# Patient Record
Sex: FEMALE | Employment: FULL TIME | URBAN - METROPOLITAN AREA
[De-identification: names, ages, dates, MRNs, and addresses within clinical notes are randomized per-mention and may not be internally consistent; named-entity substitution may affect disease eponyms.]

---

## 2017-11-20 ENCOUNTER — VIRTUAL VISIT (OUTPATIENT)
Dept: FAMILY MEDICINE | Facility: OTHER | Age: 32
End: 2017-11-20

## 2019-09-19 ENCOUNTER — THERAPY VISIT (OUTPATIENT)
Dept: PHYSICAL THERAPY | Facility: CLINIC | Age: 34
End: 2019-09-19
Payer: COMMERCIAL

## 2019-09-19 DIAGNOSIS — M53.3 SACROILIAC PAIN: ICD-10-CM

## 2019-09-19 PROCEDURE — 97161 PT EVAL LOW COMPLEX 20 MIN: CPT | Mod: GP | Performed by: PHYSICAL THERAPIST

## 2019-09-19 PROCEDURE — 97140 MANUAL THERAPY 1/> REGIONS: CPT | Mod: GP | Performed by: PHYSICAL THERAPIST

## 2019-09-19 PROCEDURE — 97110 THERAPEUTIC EXERCISES: CPT | Mod: GP | Performed by: PHYSICAL THERAPIST

## 2019-09-19 NOTE — LETTER
[facility] PHYSICAL THERAPY  [address] [facility] [facility] [address]  [phone]    2019    Re: Brian Hensley   :   1985  MRN:  [id number]   REFERRING PHYSICIAN:   Carey Prieto    [facility] PHYSICAL THERAPY  Date of Initial Evaluation:  2019  Visits:  Rxs Used: 1  Reason for Referral:  Sacroiliac pain    EVALUATION SUMMARY    [facility] Initial Evaluation  Subjective:  The history is provided by the patient. No  was used.   Patient reported as 7/10 (4/10 at it's best) on pain scale. General health as reported by patient is excellent. Pertinent medical history includes:  None.    Surgeries include:  Other. Other surgery history details: 2 C-sections.  Current medications:  None.   Primary job tasks include:  Lifting/carrying, prolonged sitting, prolonged standing and repetitive tasks.  Pain is described as sharp (catch, achy/sore) and is constant. Pain is the same all the time. Since onset symptoms are unchanged.      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    Barriers include:  None as reported by patient.  Red flags:  None as reported by patient.  Type of problem:  Pelvic dysfunction  Condition occurred with:  During pregnancy. This is a new condition   Problem details: Pt reports she's having SI pain related to her pregnancy. Notes her entire pelvis is bothering her through B sacrum and pubic bone. 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 Doesn't feel like one side is stuck, everything just hurts. Onset about 1.5 months ago. Pt is currently 28 weeks pregnant with her 3rd child. Other kids are 4 and 2 and both were born . Had similar pain with previous pregnancies which mostly resolved after delivery but it was manageable. Pain this time started earlier than before. .   Patient reports pain:  SI joint left, SI joint right and  pubic (Denies vaginal or rectal pain).   Exacerbated by: up and down off the floor. Just being on her feet, walking, in and out of her car, movement in general, laying down. and relieved by rest (stability exercises).    Objective:  System  Lumbar/SI Evaluation  ROM:    AROM Lumbar:   Flexion:          To floor - pain  Ext:                    WNL - pain   Side Bend:        Left:  To knees - pain    Right:  To knees - pain  Rotation:           Left:     Right:   Re: Brian Hensley   :   1985    Side Glide:        Left:     Right:     Lumbar Palpation:    Tenderness present at Left:    Piriformis (Very tight and tender in piriformis); PSIS and Gluteus Medius  Tenderness not present at Left:    Quadratus Lumborum; Erector Spinae; Iliac Crest or Vertebral  Tenderness present at Right: Piriformis and PSIS  Tenderness not present at Right:  Quadratus Lumborum; Erector Spinae; Iliac Crest; Gluteus Medius or Vertebral    Lumbar Provocation:    Left negative with:  PROM hip  Right negative with:  PROM hip    SI joint/Sacrum:    Normal ilial/ischial alignment. Did have some sacral torsion with L posterior sacral border and posterior PSIS. Tenderness noted over pubic bone. Normal alignment.    Assessment/Plan:    Patient is a 34 year old female with sacral and pelvic complaints.    Patient has the following significant findings with corresponding treatment plan.                Diagnosis 1:  B SI and pubic pain  Pain -  manual therapy, self management, education and home program  Decreased ROM/flexibility - manual therapy, therapeutic exercise, therapeutic activity and home program  Decreased strength - therapeutic exercise, therapeutic activities and home program  Impaired muscle performance - neuro re-education and home program  Decreased function - therapeutic activities and home program  Impaired posture - neuro re-education, therapeutic activities and home program  Instability -  Therapeutic Activity  Therapeutic  Exercise  Neuromuscular Re-education  home program    Therapy Evaluation Codes:   1) History comprised of:   Personal factors that impact the plan of care:      None.    Comorbidity factors that impact the plan of care are:      None.     Medications impacting care: None.  2) Examination of Body Systems comprised of:   Body structures and functions that impact the plan of care:      Pelvis and Sacral illiac joint.   Activity limitations that impact the plan of care are:      Lifting, Sitting, Stairs, Standing and Walking.  3) Clinical presentation characteristics are:   Stable/Uncomplicated.  4) Decision-Making    Re: Brian Hensley   :   1985      Low complexity using standardized patient assessment instrument and/or measureable assessment of functional outcome.  Cumulative Therapy Evaluation is: Low complexity.    Previous and current functional limitations:  (See Goal Flow Sheet for this information)    Short term and Long term goals: (See Goal Flow Sheet for this information)     Communication ability:  Patient appears to be able to clearly communicate and understand verbal and written communication and follow directions correctly.  Treatment Explanation - The following has been discussed with the patient:   RX ordered/plan of care  Anticipated outcomes  Possible risks and side effects  This patient would benefit from PT intervention to resume normal activities.   Rehab potential is good.    Frequency:  1 X week, once daily  Duration:  for 6-8 weeks  Discharge Plan:  Achieve all LTG.  Independent in home treatment program.  Reach maximal therapeutic benefit.    Thank you for your referral.    INQUIRIES  Therapist: ARTHUR Gregg [facility] PHYSICAL THERAPY  [address] [address]  Phone: [phone]  Fax: [phone]

## 2019-09-19 NOTE — PROGRESS NOTES
[facility] Initial Evaluation  Subjective:  The history is provided by the patient. No  was used.     and reported as 7/10 (4/10 at it's best) on pain scale. General health as reported by patient is excellent. Pertinent medical history includes:  None.    Surgeries include:  Other. Other surgery history details: 2 C-sections.  Current medications:  None.   Primary job tasks include:  Lifting/carrying, prolonged sitting, prolonged standing and repetitive tasks.  Pain is described as sharp (catch, achy/sore) and is constant. Pain is the same all the time. Since onset symptoms are unchanged.      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    Barriers include:  None as reported by patient.  Red flags:  None as reported by patient.  Type of problem:  Pelvic dysfunction   Condition occurred with:  During pregnancy. This is a new condition   Problem details: Pt reports she's having SI pain related to her pregnancy. Notes her entire pelvis is bothering her through B sacrum and pubic bone. 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 Doesn't feel like one side is stuck, everything just hurts. Onset about 1.5 months ago. Pt is currently 28 weeks pregnant with her 3rd child. Other kids are 4 and 2 and both were born . Had similar pain with previous pregnancies which mostly resolved after delivery but it was manageable. Pain this time started earlier than before. .   Patient reports pain:  SI joint left, SI joint right and pubic (Denies vaginal or rectal pain).   Exacerbated by: up and down off the floor. Just being on her feet, walking, in and out of her car, movement in general, laying down. and relieved by rest (stability exercises).                      Objective:  System         Lumbar/SI Evaluation  ROM:    AROM Lumbar:   Flexion:          To floor - pain  Ext:                    WNL -  pain   Side Bend:        Left:  To knees - pain    Right:  To knees - pain  Rotation:           Left:     Right:   Side Glide:        Left:     Right:                     Lumbar Palpation:    Tenderness present at Left:    Piriformis (Very tight and tender in piriformis); PSIS and Gluteus Medius  Tenderness not present at Left:    Quadratus Lumborum; Erector Spinae; Iliac Crest or Vertebral  Tenderness present at Right: Piriformis and PSIS  Tenderness not present at Right:  Quadratus Lumborum; Erector Spinae; Iliac Crest; Gluteus Medius or Vertebral    Lumbar Provocation:      Left negative with:  PROM hip    Right negative with:  PROM hip    SI joint/Sacrum:    Normal ilial/ischial alignment. Did have some sacral torsion with L posterior sacral border and posterior PSIS. Tenderness noted over pubic bone. Normal alignment.                                                       General     ROS    Assessment/Plan:    Patient is a 34 year old female with sacral and pelvic complaints.    Patient has the following significant findings with corresponding treatment plan.                Diagnosis 1:  B SI and pubic pain  Pain -  manual therapy, self management, education and home program  Decreased ROM/flexibility - manual therapy, therapeutic exercise, therapeutic activity and home program  Decreased strength - therapeutic exercise, therapeutic activities and home program  Impaired muscle performance - neuro re-education and home program  Decreased function - therapeutic activities and home program  Impaired posture - neuro re-education, therapeutic activities and home program  Instability -  Therapeutic Activity  Therapeutic Exercise  Neuromuscular Re-education  home program    Therapy Evaluation Codes:   1) History comprised of:   Personal factors that impact the plan of care:      None.    Comorbidity factors that impact the plan of care are:      None.     Medications impacting care: None.  2) Examination of Body  Systems comprised of:   Body structures and functions that impact the plan of care:      Pelvis and Sacral illiac joint.   Activity limitations that impact the plan of care are:      Lifting, Sitting, Stairs, Standing and Walking.  3) Clinical presentation characteristics are:   Stable/Uncomplicated.  4) Decision-Making    Low complexity using standardized patient assessment instrument and/or measureable assessment of functional outcome.  Cumulative Therapy Evaluation is: Low complexity.    Previous and current functional limitations:  (See Goal Flow Sheet for this information)    Short term and Long term goals: (See Goal Flow Sheet for this information)     Communication ability:  Patient appears to be able to clearly communicate and understand verbal and written communication and follow directions correctly.  Treatment Explanation - The following has been discussed with the patient:   RX ordered/plan of care  Anticipated outcomes  Possible risks and side effects  This patient would benefit from PT intervention to resume normal activities.   Rehab potential is good.    Frequency:  1 X week, once daily  Duration:  for 6-8 weeks  Discharge Plan:  Achieve all LTG.  Independent in home treatment program.  Reach maximal therapeutic benefit.    Please refer to the daily flowsheet for treatment today, total treatment time and time spent performing 1:1 timed codes.

## 2019-09-30 ENCOUNTER — THERAPY VISIT (OUTPATIENT)
Dept: PHYSICAL THERAPY | Facility: CLINIC | Age: 34
End: 2019-09-30
Payer: COMMERCIAL

## 2019-09-30 DIAGNOSIS — M53.3 SACROILIAC PAIN: ICD-10-CM

## 2019-09-30 PROCEDURE — 97140 MANUAL THERAPY 1/> REGIONS: CPT | Mod: GP | Performed by: PHYSICAL THERAPIST

## 2019-09-30 PROCEDURE — 97112 NEUROMUSCULAR REEDUCATION: CPT | Mod: GP | Performed by: PHYSICAL THERAPIST

## 2019-09-30 PROCEDURE — 97110 THERAPEUTIC EXERCISES: CPT | Mod: GP | Performed by: PHYSICAL THERAPIST

## 2019-10-09 ENCOUNTER — THERAPY VISIT (OUTPATIENT)
Dept: PHYSICAL THERAPY | Facility: CLINIC | Age: 34
End: 2019-10-09
Payer: COMMERCIAL

## 2019-10-09 DIAGNOSIS — M53.3 SACROILIAC PAIN: ICD-10-CM

## 2019-10-09 PROCEDURE — 97110 THERAPEUTIC EXERCISES: CPT | Mod: GP | Performed by: PHYSICAL THERAPIST

## 2019-10-09 PROCEDURE — 97112 NEUROMUSCULAR REEDUCATION: CPT | Mod: GP | Performed by: PHYSICAL THERAPIST

## 2019-10-09 PROCEDURE — 97140 MANUAL THERAPY 1/> REGIONS: CPT | Mod: GP | Performed by: PHYSICAL THERAPIST

## 2019-10-14 ENCOUNTER — THERAPY VISIT (OUTPATIENT)
Dept: PHYSICAL THERAPY | Facility: CLINIC | Age: 34
End: 2019-10-14
Payer: COMMERCIAL

## 2019-10-14 DIAGNOSIS — M53.3 SACROILIAC PAIN: ICD-10-CM

## 2019-10-14 PROCEDURE — 97140 MANUAL THERAPY 1/> REGIONS: CPT | Mod: GP | Performed by: PHYSICAL THERAPIST

## 2019-10-14 PROCEDURE — 97112 NEUROMUSCULAR REEDUCATION: CPT | Mod: GP | Performed by: PHYSICAL THERAPIST

## 2019-10-14 PROCEDURE — 97110 THERAPEUTIC EXERCISES: CPT | Mod: GP | Performed by: PHYSICAL THERAPIST

## 2019-10-28 ENCOUNTER — THERAPY VISIT (OUTPATIENT)
Dept: PHYSICAL THERAPY | Facility: CLINIC | Age: 34
End: 2019-10-28
Payer: COMMERCIAL

## 2019-10-28 DIAGNOSIS — M53.3 SACROILIAC PAIN: ICD-10-CM

## 2019-10-28 PROCEDURE — 97112 NEUROMUSCULAR REEDUCATION: CPT | Mod: GP | Performed by: PHYSICAL THERAPIST

## 2019-10-28 PROCEDURE — 97140 MANUAL THERAPY 1/> REGIONS: CPT | Mod: GP | Performed by: PHYSICAL THERAPIST

## 2019-10-28 PROCEDURE — 97110 THERAPEUTIC EXERCISES: CPT | Mod: GP | Performed by: PHYSICAL THERAPIST

## 2019-11-11 ENCOUNTER — THERAPY VISIT (OUTPATIENT)
Dept: PHYSICAL THERAPY | Facility: CLINIC | Age: 34
End: 2019-11-11
Payer: COMMERCIAL

## 2019-11-11 DIAGNOSIS — M53.3 SACROILIAC PAIN: ICD-10-CM

## 2019-11-11 PROCEDURE — 97140 MANUAL THERAPY 1/> REGIONS: CPT | Mod: GP | Performed by: PHYSICAL THERAPIST

## 2019-11-11 PROCEDURE — 97110 THERAPEUTIC EXERCISES: CPT | Mod: GP | Performed by: PHYSICAL THERAPIST

## 2019-11-11 PROCEDURE — 97112 NEUROMUSCULAR REEDUCATION: CPT | Mod: GP | Performed by: PHYSICAL THERAPIST

## 2019-12-24 ENCOUNTER — VIRTUAL VISIT (OUTPATIENT)
Dept: FAMILY MEDICINE | Facility: OTHER | Age: 34
End: 2019-12-24

## 2019-12-24 NOTE — PROGRESS NOTES
"Date: 2019 08:48:23  Clinician: Elsa Parada  Clinician NPI: [id number]  Patient: Brian Hensley  Patient : 1985  Patient Address: [address] [place], [place] [address]  [place] [address]  Patient Phone: [phone]  Visit Protocol: Eye conditions  Patient Summary:  Brian is a 34 year old (: 1985 ) female who initiated a Visit for conjunctivitis.  When asked the question \"Please sign me up to receive news, health information and promotions. \", Brian responded \"No\".    Images of her eye condition were uploaded.   Her symptoms started 1-2 days ago and affect the left eye. The symptoms consist of eye redness, itchy eye(s), eye pain, and drainage coming from the eye(s). Additionally, her vision has become more blurry since her symptoms started, but it's possible the blurry vision is caused by the eyelid swelling and/or drainage coming from the eye(s).   Symptom details     Drainage: The color of the drainage coming out of her eye(s) is yellow. The drainage is thick and causes her eyelids to be stuck shut in the morning.    Itchiness: Brian does not have seasonal allergies or hay fever.    Eye pain: She is experiencing moderate eye pain (4-6 on a 10 point pain scale). She has not taken over-the-counter medication for the pain.     Denied symptoms include bumps on the eyelid, eyelid swelling, and light sensitivity. Brian does not have subconjunctival hemorrhage. She does not feel feverish.   Precipitating events   Brian has not had a recent diagnosis of conjunctivitis. She also has not had a recent eye surgery, foreign body in the eye(s), eye injury, and cold or ear infection. It is not known if Brian has recently been exposed to someone with a red eye or an eye infection. She does not wear contact lenses.   Pertinent medical history  Brian has not ever been diagnosed with glaucoma.   Brian is not taking medication to treat her current symptoms.   Brian does not require proof of evaluation of her eye " condition before returning to school, work, or .   Brian does not smoke or use smokeless tobacco.   She denies pregnancy and is breastfeeding. Her last period was over a month ago.     MEDICATIONS: No current medications, ALLERGIES: NKDA  Clinician Response:  Dear Brian,  Based on the information provided, you most likely have bacterial conjunctivitis, more commonly called pink eye.  Medication information  I am prescribing:  Polymyxin B sulf-trimethoprim (Polytrim) 10,000 unit- 1 mg/mL ophthalmic (eye) drops. Apply 1 drop into the affected eye(s) every 3 hours, up to 6 times a day for 7 days. There are no refills with this prescription.  The medication I prescribed is an antibiotic medication. Infections can be caused by either bacteria or a virus, and often have similar symptoms, so it is possible that this is a viral infection. Antibiotics are only effective against bacterial infections, so when it is caused by a virus, the medication will not help symptoms improve or make it less contagious.  Self care  To reduce the spread of the eye infection, you should not use eye makeup until the infection has fully resolved, and be sure to wash your hands at least once per hour and avoid touching the eyes as much as possible.  The following will reduce the risk for future eye infections:     Frequent handwashing    Replace towels and washcloths daily    Do not share towels and washcloths with others    Replace eye makeup used while eyes were infected    Do not use anyone else's eye makeup     Steps you can take to be as comfortable as possible:     Avoid rubbing your eyes    Apply a cool compress to the eye(s)    Take regular breaks and remember to blink regularly when reading or using a computer for long periods of time    Wear sunglasses when outside    Wear eye protection when swimming or working with chemicals    Use good lighting     When to seek care  Please make an appointment to be seen in a clinic or  urgent care if any of the following occurs:     You develop new symptoms or your symptoms becomes worse.    Your symptoms do not improve within 2 days of starting treatment.      Diagnosis: Bacterial conjunctivitis  Diagnosis ICD: H10.9  Prescription: polymyxin B sulf-trimethoprim (Polytrim) 10,000 unit- 1 mg/mL ophthalmic (eye) drops 1 10 ml dropper bottle, 7 days supply. Apply 1 drop into the affected eye(s) every 3 hours up to 6 times a day for 7 days. Refills: 0, Refill as needed: no, Allow substitutions: yes  Pharmacy: [facility] [place] - [phone] - [address] [facility] [place] [address]

## 2020-02-26 PROBLEM — M53.3 SACROILIAC PAIN: Status: RESOLVED | Noted: 2019-09-19 | Resolved: 2020-02-26

## 2020-02-26 NOTE — PROGRESS NOTES
Discharge Note    Progress reporting period is from initial evaluation date (please see noted date below) to Nov 11, 2019.  Linked Episodes   Type: Episode: Status: Noted: Resolved: Last update: Updated by:   PHYSICAL THERAPY Pelvic pain 9/19/19 Active 9/19/2019 11/11/2019  8:38 AM Erin Brasher, PT      Comments:       Brian failed to follow up and current status is unknown.  Please see information below for last relevant information on current status.  Patient seen for 6 visits.    SUBJECTIVE  Subjective changes noted by patient:  States it has been more difficult to work as she is squatting alot, getting in/out of the car. No longer lifting son into/out of carseat. Has cut back on cleaning at home. Being up on feet is painful.  States she has had some pubic pain, constant ache, increases with activitvy. B PSIS pain  .  Current pain level is 4/10.     Previous pain level was   .   Changes in function:  Yes (See Goal flowsheet attached for changes in current functional level)  Adverse reaction to treatment or activity: None    OBJECTIVE  Changes noted in objective findings: 35 weeks reviewed body mech with functional brace, pacing self and taking more rest breaks. Modified ex program per below. Tender to palpation L piriformis     ASSESSMENT/PLAN  Diagnosis: SI pain     STG/LTGs have been met or progress has been made towards goals:  Yes, please see goal flowsheet for most current information  Assessment of Progress: current status is unknown.    Last current status: (slow progress as pregnancy progresses)   Self Management Plans:  HEP  I have re-evaluated this patient and find that the nature, scope, duration and intensity of the therapy is appropriate for the medical condition of the patient.  Brian continues to require the following intervention to meet STG and LTG's:  HEP.    Recommendations:  Discharge with current home program.  Patient to follow up with MD as needed.    Please refer to the daily  flowsheet for treatment today, total treatment time and time spent performing 1:1 timed codes.

## 2023-02-03 ENCOUNTER — TRANSCRIBE ORDERS (OUTPATIENT)
Dept: OTHER | Age: 38
End: 2023-02-03

## 2023-02-03 DIAGNOSIS — S69.92XA INJURY OF LEFT THUMB: Primary | ICD-10-CM

## 2023-02-09 ENCOUNTER — THERAPY VISIT (OUTPATIENT)
Dept: OCCUPATIONAL THERAPY | Facility: CLINIC | Age: 38
End: 2023-02-09
Attending: PHYSICIAN ASSISTANT
Payer: COMMERCIAL

## 2023-02-09 DIAGNOSIS — M79.645 THUMB PAIN, LEFT: Primary | ICD-10-CM

## 2023-02-09 PROCEDURE — 97110 THERAPEUTIC EXERCISES: CPT | Mod: GO | Performed by: OCCUPATIONAL THERAPIST

## 2023-02-09 PROCEDURE — 97760 ORTHOTIC MGMT&TRAING 1ST ENC: CPT | Mod: GO | Performed by: OCCUPATIONAL THERAPIST

## 2023-02-09 PROCEDURE — 97165 OT EVAL LOW COMPLEX 30 MIN: CPT | Mod: GO | Performed by: OCCUPATIONAL THERAPIST

## 2023-02-09 NOTE — PROGRESS NOTES
Hand Therapy Initial Evaluation    Current Date:  2023    Subjective:  Brian Hensley is a 37 year old right hand dominant female.    Diagnosis:   Left thumb injury/UCL strain possible avulsion fracture  DOI:  2023  Therapy referral: 2023  Post:  5w 4d    Patient reports symptoms of pain, stiffness/loss of motion, weakness/loss of strength and edema of the left thumb which occurred due to mitten getting caught in manual ice auger. Since onset symptoms are gradually getting better.  Special tests:  x-ray and MRI.  Previous treatment: OTC splint some of time.  General health as reported by patient is excellent.  Pertinent medical history includes:None  Medical allergies:none.  Surgical history: other:  x 3.  Medication history: None.    Occupational Profile Information:  Current occupation is PT in schools  Currently working in normal job without restrictions  Job Tasks: Computer Work, Lifting, Carrying, Prolonged Standing, Pushing, Pulling  Prior functional level:  independent-shared household chores  Barriers include:none  Mobility: No difficulty  Transportation: drives  Leisure activities/hobbies: 3 children age 3, 5 and 7    Functional Outcome Measure:  Upper Extremity Functional Index  SCORE:   Column Totals: 65/80  (A lower score indicates greater disability.)    Pain Level (Scale 0-10)   2023   At Rest 0   With Use 3   Worst 5-6     Pain Description  Date 2023   Location Thumb MP and IP joints   Pain Quality Aching and Sharp   Frequency intermittent     Pain is worst  daytime   Exacerbated by  pinching, stress thumb   Relieved by rest and splint wear   Progression Gradually getting better     Edema  Mild at thumb MCP UCL    Sensation  WNL throughout all nerve distributions; per patient report    ROM  Pain Report:  - none +    mild    ++ moderate    +++ severe   Thumb 2023   AROM  (PROM) Right Left   MP -5/60 -5/40   IP /65 /45   RABD 50 40   PABD 45 42   Retropulsion 4.0  2.5   Kapandji Opposition Scale (0-10/10) 10/10 8/10     Special Tests   2/9/2023   Thumb MP UCL Stress + stable but painful   Thumb MP RCL Stress -     Strength   (Measured in pounds)  Pain Report: - none  + mild    ++ moderate    +++ severe    2/9/2023 2/9/2023   Trials Right Left   1  2  3 72  68  69 66-  68-  59+   Average 70 34     Lat Pinch 2/9/2023 2/9/2023   Trials Right Left   1  2  3 20  19  19 15+  17+  17+   Average 19 16     3 Pt Pinch 2/9/2023 2/9/2023   Trials Right Left   1  2  3 20  19  19 12++  13++  13++   Average 19 13     Assessment:  Patient presents with symptoms consistent with diagnosis of left thumb injury, with conservative intervention.     Patient's limitations or Problem List includes:  Pain, Decreased ROM/motion, Increased edema, Weakness, Decreased stability and Decreased pinch of the left thumb which interferes with the patient's ability to perform Self Care Tasks (dressing), Work Tasks, Recreational Activities and Household Chores as compared to previous level of function.    Rehab Potential:  Excellent - Return to full activity, no limitations    Patient will benefit from skilled Occupational Therapy to increase ROM, flexibility, overall strength, pinch strength and stability of thumb and decrease pain and edema to return to previous activity level and resume normal daily tasks and to reach their rehab potential.    Barriers to Learning:  No barrier    Communication Issues:  Patient appears to be able to clearly communicate and understand verbal and written communication and follow directions correctly.    Chart Review: Chart Review and Simple history review with patient    Identified Performance Deficits: dressing, home establishment and management, work and volunteer activities    Assessment of Occupational Performance:  5 or more Performance Deficits    Clinical Decision Making (Complexity): Low complexity    Treatment Explanation:  The following has been discussed with the  patient:  RX ordered/plan of care  Anticipated outcomes  Possible risks and side effects    Plan:  Frequency:  2 X a month, once daily  Duration:  for 2 months  Treatment Plan:    Modalities:    US and Paraffin   Therapeutic Exercise:    AROM, PROM, Tendon Gliding, Blocking, Isotonics, Isometrics and Stabilization  Orthotic Fabrication:    Static Hand based  Self Care:    Self Care Tasks and Diagnostic Education     Discharge Plan:  Achieve all LTG.  Independent in home treatment program.  Reach maximal therapeutic benefit.    Home Exercise Program:  Warmth for stiffness  Ice for pain after activity  Gentle AROM thumb E/F, opposition and circumduction   MP and IP blocking  Avoid stress to UCL of thumb  Wear custom HBTS orthosis as needed to protection thumb     Next Visit:  See in 2-4 weeks  Assess response to orthosis and HEP  Progress to thumb strengthening and stabilization as tolerated

## 2023-02-09 NOTE — LETTER
[facility]  [address] [address]  [address]  [place] [address]  [phone]    2023    Re: Brian Hensley   :   1985  MRN:  [id number]   REFERRING PHYSICIAN:   Heathre NICK [facility]    Date of Initial Evaluation:  2023  Visits:  Rxs Used: 1  Reason for Referral:  Thumb pain, left    EVALUATION SUMMARY    Hand Therapy Initial Evaluation    Current Date:  2023    Subjective:  Brian Hensley is a 37 year old right hand dominant female.  Diagnosis:   Left thumb injury/UCL strain possible avulsion fracture  DOI:  2023  Therapy referral: 2023  Post:  5w 4d  Patient reports symptoms of pain, stiffness/loss of motion, weakness/loss of strength and edema of the left thumb which occurred due to mitten getting caught in manual ice auger. Since onset symptoms are gradually getting better.  Special tests:  x-ray and MRI.  Previous treatment: OTC splint some of time.  General health as reported by patient is excellent.  Pertinent medical history includes:None  Medical allergies:none.  Surgical history: other:  x 3.  Medication history: None.    Occupational Profile Information:  Current occupation is PT in schools  Currently working in normal job without restrictions  Job Tasks: Computer Work, Lifting, Carrying, Prolonged Standing, Pushing, Pulling  Prior functional level:  independent-shared household chores  Barriers include:none  Mobility: No difficulty  Transportation: drives  Leisure activities/hobbies: 3 children age 3, 5 and 7  Functional Outcome Measure:  Upper Extremity Functional Index  SCORE:   Column Totals: 65/80  (A lower score indicates greater disability.)    Pain Level (Scale 0-10)  Re: Brian Hensley   :   1985   At Rest 0   With Use 3   Worst 5-6     Pain Description  Date 2023   Location Thumb MP and IP joints   Pain Quality Aching  and Sharp   Frequency intermittent     Pain is worst  daytime   Exacerbated by  pinching, stress thumb   Relieved by rest and splint wear   Progression Gradually getting better     Edema  Mild at thumb MCP UCL    Sensation  WNL throughout all nerve distributions; per patient report    ROM  Pain Report:  - none +    mild    ++ moderate    +++ severe   Thumb 2023   AROM  (PROM) Right Left   MP -5/60 -5/40   IP /65 /45   RABD 50 40   PABD 45 42   Retropulsion 4.0 2.5   [place] Opposition Scale (0-10/10) 10/10 8/10     Special Tests   2023   Thumb MP UCL Stress + stable but painful   Thumb MP RCL Stress -     Strength   (Measured in pounds)  Pain Report: - none  + mild    ++ moderate    +++ severe    2023   Trials Right Left   1  2  3 72  68  69 66-  68-  59+   Average 70 34     Re: Brian Hensley   :   1985    Lat Pinch 2023   Trials Right Left   1  2  3 20  19  19 15+  17+  17+   Average 19 16     3 Pt Pinch 2023   Trials Right Left   1  2  3 20  19  19 12++  13++  13++   Average 19 13     Assessment:  Patient presents with symptoms consistent with diagnosis of left thumb injury, with conservative intervention.     Patient's limitations or Problem List includes:  Pain, Decreased ROM/motion, Increased edema, Weakness, Decreased stability and Decreased pinch of the left thumb which interferes with the patient's ability to perform Self Care Tasks (dressing), Work Tasks, Recreational Activities and Household Chores as compared to previous level of function.    Rehab Potential:  Excellent - Return to full activity, no limitations    Patient will benefit from skilled Occupational Therapy to increase ROM, flexibility, overall strength, pinch strength and stability of thumb and decrease pain and edema to return to previous activity level and resume normal daily tasks and to reach their rehab potential.    Barriers to Learning:  No barrier    Communication  Issues:  Patient appears to be able to clearly communicate and understand verbal and written communication and follow directions correctly.    Chart Review: Chart Review and Simple history review with patient    Identified Performance Deficits: dressing, home establishment and management, work and volunteer activities    Assessment of Occupational Performance:  5 or more Performance Deficits    Clinical Decision Making (Complexity): Low complexity    Treatment Explanation:  The following has been discussed with the patient:  RX ordered/plan of care  Anticipated outcomes  Possible risks and side effects    Plan:  Frequency:  2 X a month, once daily  Re: Brian Hensley   :   1985    Duration:  for 2 months  Treatment Plan:    Modalities:    US and Paraffin   Therapeutic Exercise:    AROM, PROM, Tendon Gliding, Blocking, Isotonics, Isometrics and Stabilization  Orthotic Fabrication:    Static Hand based  Self Care:    Self Care Tasks and Diagnostic Education     Discharge Plan:  Achieve all LTG.  Independent in home treatment program.  Reach maximal therapeutic benefit.    Home Exercise Program:  Warmth for stiffness  Ice for pain after activity  Gentle AROM thumb E/F, opposition and circumduction   MP and IP blocking  Avoid stress to UCL of thumb  Wear custom HBTS orthosis as needed to protection thumb     Next Visit:  See in 2-4 weeks  Assess response to orthosis and HEP  Progress to thumb strengthening and stabilization as tolerated       Thank you for your referral.    INQUIRIES  Therapist: PANTERA Hines/JUAN, IRAIDA  [facility] [address]  [facility] [address]  Phone: [phone]  Fax: [phone]

## 2023-03-16 ENCOUNTER — THERAPY VISIT (OUTPATIENT)
Dept: OCCUPATIONAL THERAPY | Facility: CLINIC | Age: 38
End: 2023-03-16
Payer: COMMERCIAL

## 2023-03-16 DIAGNOSIS — M79.645 THUMB PAIN, LEFT: Primary | ICD-10-CM

## 2023-03-16 PROCEDURE — 97110 THERAPEUTIC EXERCISES: CPT | Mod: GO

## 2023-03-16 NOTE — PROGRESS NOTES
Objective Measures for SOAP note 3/16/23:    Pain: Av-2 w/ use in last 2 weeks.     ROM  Pain Report:  - none +    mild    ++ moderate    +++ severe   Thumb 2023 2023 3/16/23   AROM  (PROM) Right Left Left   MP -560 -540 -560   IP /65 /45 /70   RABD 50 40 55   PABD 45 42 50   Retropulsion 4.0 2.5 nt   [facility] Opposition Scale (0-10/10) 10/10 8/10 10/10     Special Tests   2023 3/16/23   Thumb MP UCL Stress + stable but painful -   Thumb MP RCL Stress - -     Strength   (Measured in pounds)  Pain Report: - none  + mild    ++ moderate    +++ severe    2023 2023 3/16/23   Trials Right Left Left   1  2  3 72  68  69 66-  68-  59+ 68+   Average 70 34      Lat Pinch 2023 2023 3/16/23   Trials Right Left Left   1  2  3 20  19  19 15+  17+  17+ 21-   Average 19 16      3 Pt Pinch 2023 2023 3/16/23   Trials Right Left Left   1  2  3 20  19  19 12++  13++  13++ 15++   Average 19 13        HEP:    Added  and pinch strengthening for improved function and stability.    Added gentle thumb composite flexion stretch

## 2023-05-18 PROBLEM — M79.645 THUMB PAIN, LEFT: Status: RESOLVED | Noted: 2023-02-09 | Resolved: 2023-05-18
